# Patient Record
Sex: FEMALE | Race: WHITE | Employment: FULL TIME | URBAN - METROPOLITAN AREA
[De-identification: names, ages, dates, MRNs, and addresses within clinical notes are randomized per-mention and may not be internally consistent; named-entity substitution may affect disease eponyms.]

---

## 2019-10-23 ENCOUNTER — APPOINTMENT (OUTPATIENT)
Dept: CT IMAGING | Facility: CLINIC | Age: 64
DRG: 100 | End: 2019-10-23
Attending: EMERGENCY MEDICINE
Payer: COMMERCIAL

## 2019-10-23 ENCOUNTER — HOSPITAL ENCOUNTER (INPATIENT)
Facility: CLINIC | Age: 64
LOS: 2 days | Discharge: HOME | DRG: 100 | End: 2019-10-25
Attending: EMERGENCY MEDICINE | Admitting: INTERNAL MEDICINE
Payer: COMMERCIAL

## 2019-10-23 ENCOUNTER — APPOINTMENT (OUTPATIENT)
Dept: MRI IMAGING | Facility: CLINIC | Age: 64
DRG: 100 | End: 2019-10-23
Attending: INTERNAL MEDICINE
Payer: COMMERCIAL

## 2019-10-23 DIAGNOSIS — G25.3 MYOCLONIC JERKING: ICD-10-CM

## 2019-10-23 DIAGNOSIS — I10 MALIGNANT ESSENTIAL HYPERTENSION: ICD-10-CM

## 2019-10-23 DIAGNOSIS — R56.9 SEIZURES (H): Primary | ICD-10-CM

## 2019-10-23 DIAGNOSIS — I77.71 CAROTID ARTERY DISSECTION (H): ICD-10-CM

## 2019-10-23 LAB
ANION GAP SERPL CALCULATED.3IONS-SCNC: 5 MMOL/L (ref 3–14)
APTT PPP: 28 SEC (ref 22–37)
BASOPHILS # BLD AUTO: 0 10E9/L (ref 0–0.2)
BASOPHILS NFR BLD AUTO: 0.6 %
BUN SERPL-MCNC: 18 MG/DL (ref 7–30)
CALCIUM SERPL-MCNC: 9.5 MG/DL (ref 8.5–10.1)
CHLORIDE SERPL-SCNC: 107 MMOL/L (ref 94–109)
CO2 SERPL-SCNC: 26 MMOL/L (ref 20–32)
CREAT SERPL-MCNC: 0.68 MG/DL (ref 0.52–1.04)
DIFFERENTIAL METHOD BLD: NORMAL
EOSINOPHIL # BLD AUTO: 0.1 10E9/L (ref 0–0.7)
EOSINOPHIL NFR BLD AUTO: 2.7 %
ERYTHROCYTE [DISTWIDTH] IN BLOOD BY AUTOMATED COUNT: 12.7 % (ref 10–15)
GFR SERPL CREATININE-BSD FRML MDRD: >90 ML/MIN/{1.73_M2}
GLUCOSE SERPL-MCNC: 177 MG/DL (ref 70–99)
HCT VFR BLD AUTO: 40 % (ref 35–47)
HGB BLD-MCNC: 14.2 G/DL (ref 11.7–15.7)
IMM GRANULOCYTES # BLD: 0 10E9/L (ref 0–0.4)
IMM GRANULOCYTES NFR BLD: 0.2 %
INR PPP: 1.12 (ref 0.86–1.14)
LYMPHOCYTES # BLD AUTO: 2.2 10E9/L (ref 0.8–5.3)
LYMPHOCYTES NFR BLD AUTO: 41.8 %
MCH RBC QN AUTO: 30.9 PG (ref 26.5–33)
MCHC RBC AUTO-ENTMCNC: 35.5 G/DL (ref 31.5–36.5)
MCV RBC AUTO: 87 FL (ref 78–100)
MONOCYTES # BLD AUTO: 0.4 10E9/L (ref 0–1.3)
MONOCYTES NFR BLD AUTO: 7.1 %
NEUTROPHILS # BLD AUTO: 2.5 10E9/L (ref 1.6–8.3)
NEUTROPHILS NFR BLD AUTO: 47.6 %
NRBC # BLD AUTO: 0 10*3/UL
NRBC BLD AUTO-RTO: 0 /100
PLATELET # BLD AUTO: 187 10E9/L (ref 150–450)
POTASSIUM SERPL-SCNC: 4 MMOL/L (ref 3.4–5.3)
RBC # BLD AUTO: 4.6 10E12/L (ref 3.8–5.2)
SODIUM SERPL-SCNC: 138 MMOL/L (ref 133–144)
WBC # BLD AUTO: 5.2 10E9/L (ref 4–11)

## 2019-10-23 PROCEDURE — 12000000 ZZH R&B MED SURG/OB

## 2019-10-23 PROCEDURE — 25800030 ZZH RX IP 258 OP 636: Performed by: INTERNAL MEDICINE

## 2019-10-23 PROCEDURE — 96374 THER/PROPH/DIAG INJ IV PUSH: CPT

## 2019-10-23 PROCEDURE — 70498 CT ANGIOGRAPHY NECK: CPT

## 2019-10-23 PROCEDURE — 70553 MRI BRAIN STEM W/O & W/DYE: CPT

## 2019-10-23 PROCEDURE — 85730 THROMBOPLASTIN TIME PARTIAL: CPT | Performed by: EMERGENCY MEDICINE

## 2019-10-23 PROCEDURE — 25500064 ZZH RX 255 OP 636: Performed by: INTERNAL MEDICINE

## 2019-10-23 PROCEDURE — 80048 BASIC METABOLIC PNL TOTAL CA: CPT | Performed by: EMERGENCY MEDICINE

## 2019-10-23 PROCEDURE — 99291 CRITICAL CARE FIRST HOUR: CPT | Mod: GC | Performed by: PSYCHIATRY & NEUROLOGY

## 2019-10-23 PROCEDURE — 70450 CT HEAD/BRAIN W/O DYE: CPT

## 2019-10-23 PROCEDURE — 25000132 ZZH RX MED GY IP 250 OP 250 PS 637: Performed by: INTERNAL MEDICINE

## 2019-10-23 PROCEDURE — 85025 COMPLETE CBC W/AUTO DIFF WBC: CPT | Performed by: EMERGENCY MEDICINE

## 2019-10-23 PROCEDURE — 25800030 ZZH RX IP 258 OP 636: Performed by: EMERGENCY MEDICINE

## 2019-10-23 PROCEDURE — 25000128 H RX IP 250 OP 636: Performed by: PSYCHIATRY & NEUROLOGY

## 2019-10-23 PROCEDURE — 99223 1ST HOSP IP/OBS HIGH 75: CPT | Mod: AI | Performed by: INTERNAL MEDICINE

## 2019-10-23 PROCEDURE — 96375 TX/PRO/DX INJ NEW DRUG ADDON: CPT

## 2019-10-23 PROCEDURE — 85610 PROTHROMBIN TIME: CPT | Performed by: EMERGENCY MEDICINE

## 2019-10-23 PROCEDURE — A9585 GADOBUTROL INJECTION: HCPCS | Performed by: INTERNAL MEDICINE

## 2019-10-23 PROCEDURE — 25000128 H RX IP 250 OP 636: Performed by: EMERGENCY MEDICINE

## 2019-10-23 PROCEDURE — 93005 ELECTROCARDIOGRAM TRACING: CPT

## 2019-10-23 PROCEDURE — 99285 EMERGENCY DEPT VISIT HI MDM: CPT | Mod: 25

## 2019-10-23 PROCEDURE — 25000125 ZZHC RX 250: Performed by: EMERGENCY MEDICINE

## 2019-10-23 PROCEDURE — 95813 EEG EXTND MNTR 61-119 MIN: CPT

## 2019-10-23 PROCEDURE — 0042T CT HEAD PERFUSION WITH CONTRAST: CPT

## 2019-10-23 PROCEDURE — 99292 CRITICAL CARE ADDL 30 MIN: CPT | Mod: GC | Performed by: PSYCHIATRY & NEUROLOGY

## 2019-10-23 RX ORDER — AMOXICILLIN 250 MG
2 CAPSULE ORAL 2 TIMES DAILY PRN
Status: DISCONTINUED | OUTPATIENT
Start: 2019-10-23 | End: 2019-10-25 | Stop reason: HOSPADM

## 2019-10-23 RX ORDER — PROCHLORPERAZINE MALEATE 5 MG
10 TABLET ORAL EVERY 6 HOURS PRN
Status: DISCONTINUED | OUTPATIENT
Start: 2019-10-23 | End: 2019-10-25 | Stop reason: HOSPADM

## 2019-10-23 RX ORDER — SODIUM CHLORIDE 9 MG/ML
INJECTION, SOLUTION INTRAVENOUS CONTINUOUS
Status: DISCONTINUED | OUTPATIENT
Start: 2019-10-23 | End: 2019-10-24

## 2019-10-23 RX ORDER — LISINOPRIL 5 MG/1
5 TABLET ORAL DAILY
Status: DISCONTINUED | OUTPATIENT
Start: 2019-10-23 | End: 2019-10-24

## 2019-10-23 RX ORDER — HYDRALAZINE HYDROCHLORIDE 20 MG/ML
10 INJECTION INTRAMUSCULAR; INTRAVENOUS EVERY 4 HOURS PRN
Status: DISCONTINUED | OUTPATIENT
Start: 2019-10-23 | End: 2019-10-25 | Stop reason: HOSPADM

## 2019-10-23 RX ORDER — LEVETIRACETAM 10 MG/ML
1000 INJECTION INTRAVASCULAR EVERY 12 HOURS
Status: DISCONTINUED | OUTPATIENT
Start: 2019-10-24 | End: 2019-10-25 | Stop reason: HOSPADM

## 2019-10-23 RX ORDER — LIDOCAINE 40 MG/G
CREAM TOPICAL
Status: DISCONTINUED | OUTPATIENT
Start: 2019-10-23 | End: 2019-10-25 | Stop reason: HOSPADM

## 2019-10-23 RX ORDER — LABETALOL HYDROCHLORIDE 5 MG/ML
20 INJECTION, SOLUTION INTRAVENOUS ONCE
Status: COMPLETED | OUTPATIENT
Start: 2019-10-23 | End: 2019-10-23

## 2019-10-23 RX ORDER — ACETAMINOPHEN 325 MG/1
650 TABLET ORAL EVERY 4 HOURS PRN
Status: DISCONTINUED | OUTPATIENT
Start: 2019-10-23 | End: 2019-10-25 | Stop reason: HOSPADM

## 2019-10-23 RX ORDER — LABETALOL HYDROCHLORIDE 5 MG/ML
10 INJECTION, SOLUTION INTRAVENOUS
Status: DISCONTINUED | OUTPATIENT
Start: 2019-10-23 | End: 2019-10-25 | Stop reason: HOSPADM

## 2019-10-23 RX ORDER — HYDROCODONE BITARTRATE AND ACETAMINOPHEN 5; 325 MG/1; MG/1
1-2 TABLET ORAL EVERY 4 HOURS PRN
Status: DISCONTINUED | OUTPATIENT
Start: 2019-10-23 | End: 2019-10-25 | Stop reason: HOSPADM

## 2019-10-23 RX ORDER — AMOXICILLIN 250 MG
1 CAPSULE ORAL 2 TIMES DAILY PRN
Status: DISCONTINUED | OUTPATIENT
Start: 2019-10-23 | End: 2019-10-25 | Stop reason: HOSPADM

## 2019-10-23 RX ORDER — ONDANSETRON 2 MG/ML
4 INJECTION INTRAMUSCULAR; INTRAVENOUS EVERY 6 HOURS PRN
Status: DISCONTINUED | OUTPATIENT
Start: 2019-10-23 | End: 2019-10-25 | Stop reason: HOSPADM

## 2019-10-23 RX ORDER — LORAZEPAM 2 MG/ML
2 INJECTION INTRAMUSCULAR ONCE
Status: COMPLETED | OUTPATIENT
Start: 2019-10-23 | End: 2019-10-23

## 2019-10-23 RX ORDER — ONDANSETRON 4 MG/1
4 TABLET, ORALLY DISINTEGRATING ORAL EVERY 6 HOURS PRN
Status: DISCONTINUED | OUTPATIENT
Start: 2019-10-23 | End: 2019-10-25 | Stop reason: HOSPADM

## 2019-10-23 RX ORDER — ASPIRIN 81 MG/1
81 TABLET ORAL DAILY
Status: DISCONTINUED | OUTPATIENT
Start: 2019-10-23 | End: 2019-10-25 | Stop reason: HOSPADM

## 2019-10-23 RX ORDER — POLYETHYLENE GLYCOL 3350 17 G/17G
17 POWDER, FOR SOLUTION ORAL DAILY PRN
Status: DISCONTINUED | OUTPATIENT
Start: 2019-10-23 | End: 2019-10-25 | Stop reason: HOSPADM

## 2019-10-23 RX ORDER — IOPAMIDOL 755 MG/ML
120 INJECTION, SOLUTION INTRAVASCULAR ONCE
Status: COMPLETED | OUTPATIENT
Start: 2019-10-23 | End: 2019-10-23

## 2019-10-23 RX ORDER — IBUPROFEN 200 MG
200-600 TABLET ORAL DAILY PRN
Status: ON HOLD | COMMUNITY
End: 2019-10-25

## 2019-10-23 RX ORDER — PROCHLORPERAZINE 25 MG
25 SUPPOSITORY, RECTAL RECTAL EVERY 12 HOURS PRN
Status: DISCONTINUED | OUTPATIENT
Start: 2019-10-23 | End: 2019-10-25 | Stop reason: HOSPADM

## 2019-10-23 RX ORDER — NALOXONE HYDROCHLORIDE 0.4 MG/ML
.1-.4 INJECTION, SOLUTION INTRAMUSCULAR; INTRAVENOUS; SUBCUTANEOUS
Status: DISCONTINUED | OUTPATIENT
Start: 2019-10-23 | End: 2019-10-25 | Stop reason: HOSPADM

## 2019-10-23 RX ORDER — GADOBUTROL 604.72 MG/ML
9 INJECTION INTRAVENOUS ONCE
Status: COMPLETED | OUTPATIENT
Start: 2019-10-23 | End: 2019-10-23

## 2019-10-23 RX ORDER — LORAZEPAM 2 MG/ML
2 INJECTION INTRAMUSCULAR
Status: DISCONTINUED | OUTPATIENT
Start: 2019-10-23 | End: 2019-10-25 | Stop reason: HOSPADM

## 2019-10-23 RX ORDER — BISACODYL 10 MG
10 SUPPOSITORY, RECTAL RECTAL DAILY PRN
Status: DISCONTINUED | OUTPATIENT
Start: 2019-10-23 | End: 2019-10-25 | Stop reason: HOSPADM

## 2019-10-23 RX ADMIN — GADOBUTROL 9 ML: 604.72 INJECTION INTRAVENOUS at 20:00

## 2019-10-23 RX ADMIN — SODIUM CHLORIDE 100 ML: 9 INJECTION, SOLUTION INTRAVENOUS at 13:33

## 2019-10-23 RX ADMIN — LORAZEPAM 2 MG: 2 INJECTION INTRAMUSCULAR; INTRAVENOUS at 14:51

## 2019-10-23 RX ADMIN — LEVETIRACETAM 1500 MG: 100 INJECTION, SOLUTION INTRAVENOUS at 15:33

## 2019-10-23 RX ADMIN — ASPIRIN 81 MG: 81 TABLET, DELAYED RELEASE ORAL at 17:49

## 2019-10-23 RX ADMIN — LISINOPRIL 5 MG: 5 TABLET ORAL at 17:49

## 2019-10-23 RX ADMIN — SODIUM CHLORIDE: 9 INJECTION, SOLUTION INTRAVENOUS at 17:49

## 2019-10-23 RX ADMIN — LABETALOL HYDROCHLORIDE 20 MG: 5 INJECTION INTRAVENOUS at 14:56

## 2019-10-23 RX ADMIN — IOPAMIDOL 120 ML: 755 INJECTION, SOLUTION INTRAVENOUS at 13:32

## 2019-10-23 ASSESSMENT — ENCOUNTER SYMPTOMS
WEAKNESS: 1
HEADACHES: 0
NECK PAIN: 0
SPEECH DIFFICULTY: 1

## 2019-10-23 ASSESSMENT — ACTIVITIES OF DAILY LIVING (ADL): ADLS_ACUITY_SCORE: 18

## 2019-10-23 NOTE — PLAN OF CARE
Pt arrived on floor at 1700. Pt A/Ox4. VSS. Up 1 assist with walker. Denies pain. Voiding adequately. Neuros intact. MRI checklist faxed.

## 2019-10-23 NOTE — PROGRESS NOTES
PRELIMINARY EEG REPORT:    Frequent generalized spike-wave and polyspike-wave cplexes are seen on the EEG. They constitute 30% of the recording. Patient occasionally has upper body jerking with these discharges. Patient is given lorazepam at 14:52 which improves the EEG significantly. Recommend loading with levetiracetam and continue EEG monitoring.    Cary Rivas MD

## 2019-10-23 NOTE — ED NOTES
Patient's respirations are even and non labored. Patient denies CP or SOB. Patient denies headache.

## 2019-10-23 NOTE — PROGRESS NOTES
RECEIVING UNIT ED HANDOFF REVIEW    ED Nurse Handoff Report was reviewed by: Vidhya Alvarenga RN on October 23, 2019 at 4:21 PM

## 2019-10-23 NOTE — PROGRESS NOTES
Patients eeg showing generalized spike and wave complexes, some even not correlated to her jerks. Ativan slowed down frequency of seizures .    Pt loaded with 1500 mg keppra. She will require general neuro consult and vEEG overnight, keppra should be continued. Stroke team will sign off, please call if needed.

## 2019-10-23 NOTE — H&P
Admitted:     10/23/2019      PRIMARY CARE PROVIDER:  None.      HISTORY OF PRESENT ILLNESS:  Molly is a very pleasant 63-year-old female from [place], [place], here in [place] for a work-related conference, with no significant past medical history who presents to the emergency room today for evaluation of new onset neurologic symptoms including jerking of her legs and arms.  History is obtained per discussions with the patient as well as her 2 friends who accompanied her to the emergency room this afternoon.      She has no known past medical history, though notes it has been many years since she last saw a physician.  She does not take any medications on a daily basis.  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  She had been in her usual state of health.  She had a few drinks with work colleagues last evening.  This morning, she awoke feeling normal.  At around noon, she developed some new onset jerking in her upper and lower extremities.  Her friends say she was observed to have myoclonic jerking and twitching of her bilateral upper and lower extremities.  Her speech appeared slow during this time and she had some perceived confusion.  Her friends tried to help her out of a chair to seek medical attention, but she was unable to support herself and fell to the ground.  EMS was called and she was brought to the emergency room.      In the emergency room, she was seen and evaluated by Dr. Rogerio Santiago.  She was afebrile.  She was mildly tachycardic with heart rate of 102 and hypertensive with an initial blood pressure of 157/117 but quickly spiked as high as 223 systolic.  Basic labs including a BMP and CBC were unremarkable.  A code stroke was called.  Stat head imaging was obtained.  A head CT showed no evidence of acute intracranial hemorrhage or mass.  A CTA showed findings concerning for dissection of the right cervical internal carotid artery.  Given no gross perfusion deficits  on a previous CT perfusion study, it was felt that the carotid occlusion was potentially a chronic etiology.  Per neurology, a stat EEG was obtained and per Dr. Santiago', has shown some abnormal findings concerning for seizure.  She was given 2 mg of Ativan initially and will now receive 1500 mg of IV Keppra.  The plan at this juncture will be to admit her to the Neurology floor for further evaluation and management of seizures.  In regards to her hypertension, she was given 20 mg of IV labetalol with significant improvement and on last check just now her blood pressure had improved to 167/84.      When I saw the patient in the emergency room, she was alert and answering questions appropriately.  I did not observe any jerking motions and she appeared in no acute distress.      PAST MEDICAL HISTORY:  None.  Patient notes it has been several years since she had seen a physician.  She denies any known medical history of hypertension, hyperlipidemia, diabetes or cardiovascular disease.      PAST SURGICAL HISTORY:  None.      SOCIAL HISTORY:  The patient is a lifelong nonsmoker.  She drinks alcohol on occasion. She had 3 drinks last evening, but does not drink on a daily basis.  She has no history of drug use.      FAMILY HISTORY:  Both her mother and father  of a lung cancer.  She notes they were heavy smokers.  She has no family members with history of seizures, cardiovascular disease or diabetes.      HOME MEDICATIONS:  None.      ALLERGIES:  The patient has no known drug allergies.      REVIEW OF SYSTEMS:  A full 10-point review of systems was discussed with this patient and negative unless otherwise stated per HPI.      PHYSICAL EXAMINATION:   VITAL SIGNS:  Temperature 97.3, pulse 81, respirations 17, blood pressure 167/84, O2 sat 94% on room air.   GENERAL:  The patient is a well-nourished, well-developed female.  She appears her stated age, alert and answering questions appropriately, in no acute distress.    NEUROLOGIC:  Cranial nerves II-XII are grossly intact.  Strength is intact and symmetric in bilateral upper and lower extremities.  Sensation is intact to light touch throughout.  She does have some past pointing in bilateral upper extremities with finger-to-nose motion.  Gait was not assessed.   CARDIOVASCULAR:  Heart rate and rhythm regular, no murmurs, gallops or rubs.  Pulses are +2 and symmetric in bilateral upper extremities.  There is no extremity edema.   RESPIRATORY:  Lungs are clear to auscultation bilaterally.  No wheezes, rales or rhonchi, no increased work of breathing or accessory muscle use.   ABDOMEN:  Soft, nontender, nondistended, positive bowel sounds throughout.   INTEGUMENTARY:  Warm, dry, no rashes, jaundice or ecchymosis.      LABORATORY DATA AND IMAGING:  BMP shows sodium of 138, potassium 4.0, creatinine 0.68, calcium 9.5, glucose 177.  CBC showed white count of 5.2, hemoglobin 14.2 and a platelet count of 187.  INR is 1.12.      EKG showed normal sinus rhythm with no acute ST or T-wave changes.      Head imaging included a head CT without contrast that was negative for evidence of acute intracranial hemorrhage, mass or herniation.      A CT angiogram of the head and neck showed findings concerning for dissection of the right cervical internal carotid artery with non-opacification from the level of the proximal cervical right internal carotid artery to its paraclinoid segment.  There was no other obvious central arterial occlusion.  CT perfusion scan showed no gross focal or regional perfusion asymmetry, suggesting that the right carotid occlusion was potentially a chronic phenomenon.  She was also noted to have a 2.3cm nodularity of the right lobe of the thyroid.     ASSESSMENT AND PLAN:  Molly Davis is a very pleasant 63-year-old female with no significant past medical history, here from [place], [place], here in [place] for work conference who presents to the emergency room for  evaluation of new onset jerking motions in her upper and lower extremities with slowed speech and confusion.  Initial workup in the emergency room was negative for stroke.  An EEG is being done at this time and is suggestive of a seizure.  She will be admitted to the [facility] for ongoing evaluation and care.   1.  Suspected new onset seizures.  Patient's symptoms began acutely at noon this afternoon.  She has no recent injury or trauma.  She has no previously diagnosed medical problems, though again it has been some time since she has seen a physician.  Initial code stroke workup in the emergency room was nonrevealing.  An EEG is being obtained at the time of this dictation and showed some abnormalities concerning for seizure-like activity.  It was observed that after an initial dose of lorazepam her EEG had significantly improved.  It has been recommended that she be loaded with IV Keppra and admitted to the hospital.   1500 mg of Keppra was given in the emergency room.  Will continue EEG monitoring.  General Neurology Service has been consulted to assist with ongoing evaluation and management of her seizure.  Will continue neuro checks per floor protocol.  Continue Ativan as needed.  I have initially dosed her Keppra at 1000 mg b.i.d.  Can titrate dosing further as needed.  Await input from General Neurology Service.  I suspect her difficulties with ambulating today were due to the seizures themselves.  If her weakness persists once seizures are well managed, would consider a PT consult.     2.  Right internal carotid artery dissection.  Again, given lack of abnormalities noted on a CT perfusion scan, suspected this is likely chronic in nature.  No recommendations for anticoagulation at this point.  She will be started on a low-dose aspirin per Neurology recommendations.  Ongoing BP management as below.   3.  Hypertension, with hypertensive urgency.  As above, the patient's systolic blood pressures were  upwards of 220 while in the emergency room today.  She denies any known history of hypertension.  After 20 mg of IV labetalol her pressures have significantly improved to the 160s systolic.  Per Neurology recommendations, will keep a goal systolic blood pressure less than 180.  I have started her on a baseline antihypertensive with lisinopril 5 mg daily.  Labetalol and hydralazine are also available as needed.   4.  Thyroid nodule.  Incidentally noted on CT imaging today.  Have ordered a TSH for tomorrow morning.  Will need to establish with a primary care provider on return to [place] for continued evaluation.     Deep venous thrombosis prophylaxis.  PCDs.      CODE STATUS:  I discussed this with the patient at bedside.  She wishes to be full code.      As I anticipate she will be here for greater than a 2-midnight stay to complete workup of seizures and initiate appropriate treatment, she has been admitted under inpatient status.         VINCE COLE DO             D: 10/23/2019   T: 10/23/2019   MT: SHAYLA      Name:     VITA NEVILLE   MRN:      [id number]        Account:      [id number]   :      1955        Admitted:     10/23/2019                   Document: [id number]

## 2019-10-23 NOTE — PHARMACY-ADMISSION MEDICATION HISTORY
Admission medication history interview status for the 10/23/2019  admission is complete. See EPIC admission navigator for prior to admission medications     Medication history source reliability:Good - patient    Actions taken by pharmacist (provider contacted, etc): Discussed med list with patient     Additional medication history information not noted on PTA med list : She reports only takes Advil or Aleve as needed, does not take regular daily medications.    Medication reconciliation/reorder completed by provider prior to medication history? No    Time spent in this activity: 5 min    Prior to Admission medications    Medication Sig Last Dose Taking? Auth Provider   ibuprofen (ADVIL/MOTRIN) 200 MG tablet Take 200-600 mg by mouth daily as needed for mild pain Past Week at Unknown time Yes Unknown, Entered By History   Naproxen Sodium (ALEVE PO) Take 1 tablet by mouth daily as needed for moderate pain Past Week at Unknown time Yes Unknown, Entered By History

## 2019-10-23 NOTE — ED TRIAGE NOTES
Patient brought in by EMS. EMS reports patient had some involuntary jerking movements and patient was unable to walk or get up.

## 2019-10-23 NOTE — ED NOTES
Bed: ST01  Expected date:   Expected time:   Means of arrival:   Comments:  Tamara 514--64 yo F--Stroke alert--onset 1200--eta 1300

## 2019-10-23 NOTE — PROGRESS NOTES
Formerly [facility], [facility]  EEG completed in ER sto 1,   1 hour with Video done.  Consent for video per patient. Ordering Dr. JENN Santiago.

## 2019-10-23 NOTE — ED PROVIDER NOTES
"  History     Chief Complaint:    Uncontrollable jerkiness and confusion     The history is provided by the patient and the EMS personnel.      Molly Davis is a 63 year old female who arrived via EMS with minimal medical history because she \"hasn't been to a doctor in a long time\", who presents with uncontrollable \"jerkiness\", confusion and delayed speech. The patient was visiting from [place], [place] for a conference. About an hour ago, at 1200, she started having uncontrolled jerking sensations that caused her to fall twice just before everyone was going to leave to catch their flights home. The patient was unable to get up and walk, but she did not hit her head. The patient has never experienced the jerkiness before. She is now confused and has delayed speech. She reports having 3 scotches last night, but doesn't normally drink. The patient denies headache or neck pain.     Allergies:  No known drug allergies    Medications:    The patient is not currently taking any prescribed medications.    Past Medical History:    The patient denies any significant past medical history.    Past Surgical History:    The patient does not have any pertinent past surgical history.    Family History:    No past pertinent family history.    Social History:  Presents to the ED with EMS at the bedside  Tobacco Use: n/a  Alcohol Use: yes  Drug Use: n/a  Marital Status: n/a    Review of Systems   Musculoskeletal: Negative for neck pain.   Neurological: Positive for speech difficulty and weakness. Negative for headaches. Tremors: jerky movements         Uncontrollable jerkiness    All other systems reviewed and are negative.        Physical Exam     Patient Vitals for the past 24 hrs:   BP Temp Pulse Heart Rate Resp SpO2 Weight   10/23/19 1515 (!) 167/84 -- 81 80 17 92 % --   10/23/19 1502 (!) 150/68 -- -- 77 19 93 % --   10/23/19 1500 (!) 205/94 -- 77 78 19 92 % --   10/23/19 1445 (!) 211/87 -- 102 99 23 -- --   10/23/19 1430 (!) " 215/102 -- 100 101 16 -- --   10/23/19 1415 (!) 223/117 -- 106 105 12 -- --   10/23/19 1400 -- -- 106 102 13 -- --   10/23/19 1345 (!) 214/89 -- 101 -- -- 96 % --   10/23/19 1340 (!) 147/89 -- -- -- -- -- --   10/23/19 1315 (!) 157/117 -- 103 -- -- 100 % --   10/23/19 1313 (!) 157/117 97.3  F (36.3  C) 103 -- 16 99 % 88.2 kg (194 lb 7.1 oz)     Physical Exam  GENERAL: well developed, pleasant  HEAD: atraumatic  EYES: pupils reactive, extraocular muscles intact, conjunctivae normal  ENT:  mucus membranes moist  NECK:  trachea midline, normal range of motion  RESPIRATORY: no tachypnea, breath sounds clear to auscultation   CVS: normal S1/S2, no murmurs, intact distal pulses. Mild tachycardia  ABDOMEN: soft, nontender, nondistention  MUSCULOSKELETAL: no deformities  SKIN: warm and dry, no acute rashes or ulceration  NEURO: GCS 15, cranial nerves intact, alert and oriented x3 Occasional mono clonic jerking motions of the arm and occasional of the legs. No facial twitiching. Slightly slow speech Alert and oriented. Cannot follow complex commands.   PSYCH:  Mood/affect normal    Emergency Department Course   Indication: uncontrollable jerkiness   Time: 1347  Vent. Rate 99 bpm. IN interval 146. QRS duration 86. QT/QTc 374/479. P-R-T axis 72 59 82.  Normal sinus rhythm. Possible left atrial enlargement. Low voltage QRS. Borderline EKG. Read time: 1350 by Dr. Jack KING.     Imaging:  Radiology findings were communicated with the patient who voiced understanding of the findings.    CT head perfusion w/ IV contrast:   No gross focal or regional perfusion asymmetry is  identified. Therefore, the right carotid occlusion is potentially a  chronic phenomenon. Clinical correlation is recommended. Consider MRI  for further characterization, as warranted, as per radiology.    CT head and neck w/ IV contrast:   1. Findings concerning for dissection of the right cervical internal  carotid artery, with nonopacification from the level of  the proximal  cervical right internal carotid artery to its paraclinoid segment.  There is reconstitution into the right carotid terminus via a patent  [place] of [place].  2. No obvious central arterial occlusion involving the middle cerebral  arteries, anterior cerebral arteries, posterior cerebral arteries, or  the vertebrobasilar system, as per radiology.    CT head w/o IV contrast:   No evidence of acute intracranial hemorrhage, mass, or  herniation. ASPECT SCORE = 10, as per radiology.    Laboratory:  Laboratory findings were communicated with the patient who voiced understanding of the findings.    CBC: WBC: 5.2, HGB: 14.2, PLT: 187  BMP: Glucose 177 (H) o/w WNL (Creatinine 0.68)  INR: 1.12  PTT (Collected at 1314): 28    Interventions:  1451 Ativan 2 mg IV  1456 labetalol 20 mg IV  1533 Keppra 1500 mg PO    Emergency Department Course:  Past medical records, nursing notes, and vitals reviewed.     EKG obtained in the ED, see results above.    IV was inserted and blood was drawn for laboratory testing, results above.  The patient was sent for a CT while in the emergency department, results above.     1304 Dr. Santiago in room waiting for patient   1307 EMS arrives with the patient.  1311 Dr. Santiago performs patient exam   1317 Dr. Santiago called code stroke.  1319 Patient left for CAT scan.   1320 Dr. Santiago went to CT.   1440 Patient recheck.   1529 I consulted with Dr. Navarro/Lee, general neurology, regarding the patient's history and presentation here in the emergency department.  1554 I consulted with Dr. Navarro/Lee, general neurology, regarding the patient's history and presentation here in the emergency department.    1630: I consulted with Dr. Diallo of the hospitalist services, who is in agreement to accept the patient for admission.    Findings and plan explained to the Patient who consents to admission. Discussed the patient with Dr. Diallo, who will admit the patient to a  bed for further monitoring,  evaluation, and treatment.    I personally reviewed the laboratory and imaging results with the Patient and answered all related questions prior to admission.    Impression & Plan     Medical Decision Making:  Patient presents with sudden onset of myoclonic jerking and slow speech with mild confusion.  She is awake throughout this.  She has not seen medical care in many years and is from out of town.  Code stroke was called given the atypical presentation and speech difficulties.  CT shows a dissection versus occlusion that is suspected to be chronic.  Stat EEG showed abnormal waveforms and was suggested to give medications for seizures.  She was given Ativan and Keppra with resolution of her abnormal movements.  Patient was also noted to have bigeminy when she first arrived before getting CT, but this subsided.  Patient was also noted to be quite hypertensive persistently and was given treatment for this.  Neuro critical care was initially involved and suggested general neurology is felt that it was more of a seizure related issue.  Suggestion is MRI brain, seizure treatment, blood pressure management, and follow-up with neurologist regarding the carotid artery finding back [place] home state.  Patient will be admitted for ongoing work-up and treatment.    Critical Care Time: was 35 minutes for this patient excluding procedures    Discharge Diagnosis:    ICD-10-CM    1. Carotid artery dissection (H) I77.71     versus chronic occlusion   2. Myoclonic jerking G25.3    3. Malignant essential hypertension I10               CMS Diagnoses:      Disposition:  Admission     Scribe Disclosure:  Rose Marie SANFORD, am serving as a scribe at 1:10 PM on 10/23/2019 to document services personally performed byRogerio Santiago MD based on my observations and the provider's statements to me.   Scribe Disclosure:  Amaris SANFORD, am serving as a scribe at 1:43 PM on 10/23/2019 to document services personally performed by Jack  Rogerio WILSON MD based on my observations and the provider's statements to me.    10/23/2019    EMERGENCY DEPARTMENT       Rogerio Santiago MD  10/24/19 0952

## 2019-10-23 NOTE — ED NOTES
Patient seen to have intermittent jerking movements of extremities. Patient having difficulty with word finding and following complex instructions and 3 level commands.

## 2019-10-23 NOTE — CONSULTS
[facility]    Stroke Consult Note    Reason for Consult: full body jerking    Chief Complaint: Generalized Weakness      HPI  Molly Davis is a 63 year old female no significant medical history with full body jerking.  Stated that she woke up with full body jerking was with friends until noontime when she developed worsening of jerking and had a fall, she was then transferred to here for stroke eval.  No h/o of seizures or h/o stroke.     TPA Treatment   Not likely stroke    Endovascular Treatment  No large vessel occlusion    Impression  Unlikely stroke. Possible multifocal myoclonic jerks in setting of seizures. She also has some slowness in response to questions and also difficulty with attention on mental status exam and also difficulty following 2 step commands.  CTH negative for bleed, CTA does show likely chronically occluded R ICA, CTP negative for perfusion deficit.  Of note her BP is high casey 200s on admission which may cause hypertensive encephalopathy.       Recommendations:  [] Stat 1 hour EEG to rule out seizures  [] Treatment of hypertensive encephalopathy as that may be causing her slowness of thinking  [] If patient is admitted to floor, patient will require general neurology consult  [] MRI brain with and w/o contrast to investigate cause of these jerks  [] Ok to admit to floor   [] Stroke team will likely sign off , ok to start ASA 81 mg qday from stroke side, she should see a neurologist back home in [place] so they can monitor her carotids on regular basis         ______________________________________________________    Past Medical History   No past medical history on file.  Past Surgical History   No past surgical history on file.  Medications   Home Meds  Prior to Admission medications    Not on File       Scheduled Meds    sodium chloride 0.9 %  100 mL Intravenous Once     iopamidol  120 mL Intravenous Once       Infusion Meds      PRN Meds      Allergies   No Known  Allergies  Family History   No family history on file.  Social History   Social History     Tobacco Use     Smoking status: Not on file   Substance Use Topics     Alcohol use: Not on file     Drug use: Not on file       Review of Systems   The 10 point Review of Systems is negative other than noted in the HPI or here.        PHYSICAL EXAMINATION  Temp:  [97.3  F (36.3  C)] 97.3  F (36.3  C)  Pulse:  [103] 103  Resp:  [16] 16  BP: (157)/(117) 157/117  SpO2:  [99 %] 99 %     General:  patient lying in bed without any acute distress    HEENT:  normocephalic/atraumatic  Cardio:  RRR  Pulmonary:  no respiratory distress  Abdomen: soft non tender  Extremities: pulses intact  Skin:  no lesions     Neurologic  Mental Status:  alert, oriented x 3, follows commands, speech clear and fluent, naming and repetition normal  Her attention is diminished unable to spell word WORLD backwards, requires much prompting, and unable to follow 2 steps commands   Cranial Nerves:  visual fields intact, EOMI with normal smooth pursuit, hearing not formally tested but intact to conversation, no dysarthria, shoulder shrug equal bilaterally, tongue protrusion midline, no facial droop. Sensory equal bilaterally in v/1/2/3 distribution  Motor: no pronator drift  5/5 in all  4 ext  Multifocal segmental myoclonic jerks    Reflexes:  deferred  Sensory:  sensation decrease to light touch on the left body, no extinction  Coordination:  normal finger-to-nose and heel-to-shin bilaterally without dysmetria  Station/Gait:  deferred     Dysphagia Screen  Per Nursing    Stroke Scales    NIHSS  Interval baseline (10/23/19 1330)   Interval Comments     1a. Level of Consciousness 0-->Alert, keenly responsive   1b. LOC Questions 0-->Answers both questions correctly   1c. LOC Commands 0-->Performs both tasks correctly   2.   Best Gaze 0-->Normal   3.   Visual 0-->No visual loss   4.   Facial Palsy 0-->Normal symmetrical movements   5a. Motor Arm, Left 0-->No  drift, limb holds 90 (or 45) degrees for full 10 secs   5b. Motor Arm, Right 0-->No drift, limb holds 90 (or 45) degrees for full 10 secs   6a. Motor Leg, Left 0-->No drift, leg holds 30 degree position for full 5 secs   6b. Motor Leg, right 0-->No drift, leg holds 30 degree position for full 5 secs   7.   Limb Ataxia 0-->Absent   8.   Sensory 0-->Normal, no sensory loss   9.   Best Language 0-->No aphasia, normal   10. Dysarthria 0-->Normal   11. Extinction and Inattention  0-->No abnormality   Total 0 (10/23/19 1330)       Imaging  I personally reviewed all imaging; relevant findings per HPI.     Lab Results Data   CBC  Recent Labs   Lab 10/23/19  1314   WBC 5.2   RBC 4.60   HGB 14.2   HCT 40.0        Basic Metabolic Panel    No results for input(s): NA, POTASSIUM, CHLORIDE, CO2, BUN, CR, GLC, DENZEL in the last 168 hours.  Liver Panel  No lab results found.  INRNo lab results found.   Lipid ProfileNo lab results found.  A1CNo lab results found.  Troponin Steve results for input(s): TROPI in the last 168 hours.       Stroke Code / Stroke Consult Data Data   Stroke Code Data  (for stroke code without tele)  Stroke code activated 10/23/19   1318   First stroke provider response 10/23/19   1322   Last known normal 10/23/19   1200   Time of discovery   (or onset of symptoms) 10/23/19   1200   Head CT read by me      no   Was stroke code de-escalated? Yes

## 2019-10-23 NOTE — ED NOTES
"[facility]  ED Nurse Handoff Report    ED Chief complaint: Generalized Weakness      ED Diagnosis:   Final diagnoses:   Carotid artery dissection (H) - versus chronic occlusion   Myoclonic jerking   Malignant essential hypertension       Code Status: Not addressed by ED MD.    Allergies: No Known Allergies    Activity level - Baseline/Home:  Independent  Activity Level - Current:   Stand with Assist of 2    Patient's Preferred language: English   Needed?: No    Isolation: No  Infection: Not Applicable  Bariatric?: No    Vital Signs:   Vitals:    10/23/19 1313 10/23/19 1340   BP: (!) 157/117 (!) 147/89   Pulse: 103    Resp: 16    Temp: 97.3  F (36.3  C)    SpO2: 99%    Weight: 88.2 kg (194 lb 7.1 oz)        Cardiac Rhythm: ,        Pain level: 0-10 Pain Scale: 0    Is this patient confused?: Yes   Does this patient have a guardian?  No         If yes, is there guardianship documents in the Epic \"Code/ACP\" activity?  N/A         Guardian Notified?  N/A  [place] - Suicide Severity Rating Scale Completed?  Yes  If yes, what color did the patient score?  White    Patient Report: Initial Complaint: Confusion/generalized weakness  Focused Assessment: Patient is from [place] and was here for a Parish meeting for the [employer]. Patient was to fly back to [place] today. Patient comes in with complaints of involuntary jerking movements since 1200 today. Patient was attempting to get up and legs buckled under her causing patient to fall. Patient denies headache and denies pain.    Patient is having some difficulty with word finding and following complex commands.    Patient's respirations are even and non labored. Patient denies CP or SOB. Patient's BP is 223/117.    Patient is having an EEG in ED.   Tests Performed:   Results for orders placed or performed during the hospital encounter of 10/23/19   CTA Head Neck with Contrast    Narrative    CT ANGIOGRAM OF THE HEAD AND NECK WITH CONTRAST October " 23, 2019 1:33  PM     HISTORY: Transient ischemic infarct, initial exam. Code Stroke.     TECHNIQUE: CT angiography with an injection of 70mL ISOVUE-370 IV with  scans through the head and neck. Images were transferred to a separate  3-D workstation where multiplanar reformations and 3-D images were  created. Estimates of carotid stenoses are made relative to the distal  internal carotid artery diameters except as noted. Radiation dose for  this scan was reduced using automated exposure control, adjustment of  the mA and/or kV according to patient size, or iterative  reconstruction technique.      COMPARISON: CT head of same date.    CT HEAD FINDINGS: No contrast enhancing lesions. Please see separate  report from CT head of same date for details regarding structural  intracranial findings.    CT ANGIOGRAM HEAD FINDINGS: The distal cervical, petrous, cavernous  and paraclinoid segments of the right internal carotid artery are  occluded. There is reconstitution into the right carotid terminus,  likely related to collateral flow across a patent [place] of Patrick.  The bilateral middle and anterior cerebral arteries appear patent  centrally. There is a hypoplastic A1 segment of the right anterior  cerebral artery. The anterior communicating artery and right posterior  communicating artery are present. The left posterior communicating  artery appears to be hypoplastic.    There is mild stenosis of the V4 segment of the right vertebral  artery. The left vertebral artery is patent. The bilateral basilar  artery and its major branches are patent. The bilateral posterior  cerebral arteries are patent. No aneurysm or other vascular  malformation is seen.    CT ANGIOGRAM NECK FINDINGS: Normal origin of the great vessels from  the aortic arch.     Right carotid artery: There is tapered narrowing of the right cervical  internal carotid artery just distal to the carotid bifurcation with  subsequent complete nonopacification from  the proximal cervical  internal carotid artery to the level of the skull base and involving  the intracranial internal carotid artery to the level of the  paraclinoid region. The findings are suspicious for right cervical  internal carotid artery dissection. There is a background of mild  mixed plaque at the right carotid bifurcation.    Left carotid artery: The left common and internal carotid arteries are  patent. No significant stenosis or atherosclerotic disease in the  carotid artery. Tortuous left internal carotid artery.    Vertebral arteries: Vertebral arteries are patent without evidence of  dissection. No significant stenosis.     Other findings: Mildly heterogeneous thyroid gland with calcification  in the left thyroid lobe but no dominant mass. Degenerative disc  disease, most pronounced at C5-C6 and C6-C7.      Impression    IMPRESSION:  1. Findings concerning for dissection of the right cervical internal  carotid artery, with nonopacification from the level of the proximal  cervical right internal carotid artery to its paraclinoid segment.  There is reconstitution into the right carotid terminus via a patent  [place] of Patrick.  2. No obvious central arterial occlusion involving the middle cerebral  arteries, anterior cerebral arteries, posterior cerebral arteries, or  the vertebrobasilar system.    The findings were discussed by phone by Dr. Ying with Dr. Santiago at  1:37 PM on 10/23/2019.      CT Head Perfusion w Contrast    Narrative    CT BRAIN PERFUSION October 23, 2019 1:46 PM    HISTORY: Transient ischemic attack, initial exam. Code Stroke.    TECHNIQUE: Time sequential axial CT images of the head were acquired  during the administration of 70mL ISOVUE-370 IV. Color perfusion maps  of the brain were created from this time sequential axial source data.      Radiation dose for this scan was reduced using automated exposure  control, adjustment of the mA and/or kV according to patient size,  or  iterative reconstruction technique.    COMPARISON: CT angiogram of the head and neck of same date.    FINDINGS: No gross focal or regional perfusion asymmetry is  identified. Therefore, the right carotid occlusion is potentially a  chronic phenomenon. Clinical correlation is recommended. Consider MRI  for further characterization, as warranted.   CT Head w/o Contrast    Narrative    CT SCAN OF THE HEAD WITHOUT CONTRAST   10/23/2019 1:29 PM     HISTORY: TIA, initial exam. Code stroke.    TECHNIQUE:  Axial images of the head and coronal reformations without  IV contrast material. Radiation dose for this scan was reduced using  automated exposure control, adjustment of the mA and/or kV according  to patient size, or iterative reconstruction technique.    COMPARISON: None.    FINDINGS: There is no evidence of intracranial hemorrhage, mass, acute  infarct or anomaly. Small ovoid hypodensity along the inferomedial  aspect of the lentiform nucleus, which may represent a dilated  perivascular space. The ventricles are normal in size, shape and  configuration. Mild diffuse parenchymal volume loss. Mild patchy  periventricular white matter hypodensities which are nonspecific, but  likely related to chronic microvascular ischemic disease. Scattered  intracranial vascular calcifications are present.    There is a prominent ossified lesion arising from the inner table of  the right anterior parietal calvarium (series 5 image 24) with no  significant associated mass effect. This may represent a small area of  hyperostosis frontalis interna or small osteoma.  The visualized  portions of the sinuses and mastoids appear normal. The bony calvarium  and bones of the skull base appear intact.       Impression    IMPRESSION:   No evidence of acute intracranial hemorrhage, mass, or  herniation. ASPECT SCORE = 10.    The findings were communicated by phone by Dr. Ying to Dr. Santiago at  1:37 PM on 10/23/2019.     Basic metabolic panel    Result Value Ref Range    Sodium 138 133 - 144 mmol/L    Potassium 4.0 3.4 - 5.3 mmol/L    Chloride 107 94 - 109 mmol/L    Carbon Dioxide 26 20 - 32 mmol/L    Anion Gap 5 3 - 14 mmol/L    Glucose 177 (H) 70 - 99 mg/dL    Urea Nitrogen 18 7 - 30 mg/dL    Creatinine 0.68 0.52 - 1.04 mg/dL    GFR Estimate >90 >60 mL/min/[1.73_m2]    GFR Estimate If Black >90 >60 mL/min/[1.73_m2]    Calcium 9.5 8.5 - 10.1 mg/dL   CBC with platelets differential   Result Value Ref Range    WBC 5.2 4.0 - 11.0 10e9/L    RBC Count 4.60 3.8 - 5.2 10e12/L    Hemoglobin 14.2 11.7 - 15.7 g/dL    Hematocrit 40.0 35.0 - 47.0 %    MCV 87 78 - 100 fl    MCH 30.9 26.5 - 33.0 pg    MCHC 35.5 31.5 - 36.5 g/dL    RDW 12.7 10.0 - 15.0 %    Platelet Count 187 150 - 450 10e9/L    Diff Method Automated Method     % Neutrophils 47.6 %    % Lymphocytes 41.8 %    % Monocytes 7.1 %    % Eosinophils 2.7 %    % Basophils 0.6 %    % Immature Granulocytes 0.2 %    Nucleated RBCs 0 0 /100    Absolute Neutrophil 2.5 1.6 - 8.3 10e9/L    Absolute Lymphocytes 2.2 0.8 - 5.3 10e9/L    Absolute Monocytes 0.4 0.0 - 1.3 10e9/L    Absolute Eosinophils 0.1 0.0 - 0.7 10e9/L    Absolute Basophils 0.0 0.0 - 0.2 10e9/L    Abs Immature Granulocytes 0.0 0 - 0.4 10e9/L    Absolute Nucleated RBC 0.0    INR   Result Value Ref Range    INR 1.12 0.86 - 1.14   Partial thromboplastin time   Result Value Ref Range    PTT 28 22 - 37 sec       Abnormal Results:   Abnormal Labs Reviewed   BASIC METABOLIC PANEL - Abnormal; Notable for the following components:       Result Value    Glucose 177 (*)     All other components within normal limits       Treatments provided: EEG    Family Comments: Friends at bedside. Patient does not have a spouse or children.     OBS brochure/video discussed/provided to patient/family: No              Name of person given brochure if not patient: N/A              Relationship to patient: N/A    ED Medications:   Medications   100mL Saline Flush (100 mLs  Intravenous Given 10/23/19 1333)   iopamidol (ISOVUE-370) solution 120 mL (120 mLs Intravenous Given 10/23/19 1332)       Drips infusing?:  No    For the majority of the shift this patient was Green.   Interventions performed were N/A.    Severe Sepsis OR Septic Shock Diagnosis Present: No    To be done/followed up on inpatient unit:  N/A    ED NURSE PHONE NUMBER: [phone]

## 2019-10-24 LAB
ANION GAP SERPL CALCULATED.3IONS-SCNC: 3 MMOL/L (ref 3–14)
BUN SERPL-MCNC: 24 MG/DL (ref 7–30)
CALCIUM SERPL-MCNC: 8.7 MG/DL (ref 8.5–10.1)
CHLORIDE SERPL-SCNC: 110 MMOL/L (ref 94–109)
CO2 SERPL-SCNC: 27 MMOL/L (ref 20–32)
CREAT SERPL-MCNC: 0.67 MG/DL (ref 0.52–1.04)
ERYTHROCYTE [DISTWIDTH] IN BLOOD BY AUTOMATED COUNT: 12.8 % (ref 10–15)
GFR SERPL CREATININE-BSD FRML MDRD: >90 ML/MIN/{1.73_M2}
GLUCOSE SERPL-MCNC: 168 MG/DL (ref 70–99)
HBA1C MFR BLD: 6.3 % (ref 0–5.6)
HCT VFR BLD AUTO: 36 % (ref 35–47)
HGB BLD-MCNC: 12.4 G/DL (ref 11.7–15.7)
MCH RBC QN AUTO: 30.5 PG (ref 26.5–33)
MCHC RBC AUTO-ENTMCNC: 34.4 G/DL (ref 31.5–36.5)
MCV RBC AUTO: 89 FL (ref 78–100)
PLATELET # BLD AUTO: 178 10E9/L (ref 150–450)
POTASSIUM SERPL-SCNC: 3.8 MMOL/L (ref 3.4–5.3)
RBC # BLD AUTO: 4.06 10E12/L (ref 3.8–5.2)
SODIUM SERPL-SCNC: 140 MMOL/L (ref 133–144)
TSH SERPL DL<=0.005 MIU/L-ACNC: 1.24 MU/L (ref 0.4–4)
WBC # BLD AUTO: 4.8 10E9/L (ref 4–11)

## 2019-10-24 PROCEDURE — 85027 COMPLETE CBC AUTOMATED: CPT | Performed by: INTERNAL MEDICINE

## 2019-10-24 PROCEDURE — 99207 ZZC CDG-CODE CATEGORY CHANGED: CPT | Performed by: INTERNAL MEDICINE

## 2019-10-24 PROCEDURE — 25000132 ZZH RX MED GY IP 250 OP 250 PS 637: Performed by: INTERNAL MEDICINE

## 2019-10-24 PROCEDURE — 83036 HEMOGLOBIN GLYCOSYLATED A1C: CPT | Performed by: INTERNAL MEDICINE

## 2019-10-24 PROCEDURE — 80048 BASIC METABOLIC PNL TOTAL CA: CPT | Performed by: INTERNAL MEDICINE

## 2019-10-24 PROCEDURE — 99233 SBSQ HOSP IP/OBS HIGH 50: CPT | Performed by: INTERNAL MEDICINE

## 2019-10-24 PROCEDURE — 36415 COLL VENOUS BLD VENIPUNCTURE: CPT | Performed by: INTERNAL MEDICINE

## 2019-10-24 PROCEDURE — 84443 ASSAY THYROID STIM HORMONE: CPT | Performed by: INTERNAL MEDICINE

## 2019-10-24 PROCEDURE — 12000000 ZZH R&B MED SURG/OB

## 2019-10-24 PROCEDURE — 25000128 H RX IP 250 OP 636: Performed by: INTERNAL MEDICINE

## 2019-10-24 RX ORDER — LISINOPRIL 20 MG/1
20 TABLET ORAL DAILY
Status: DISCONTINUED | OUTPATIENT
Start: 2019-10-25 | End: 2019-10-25 | Stop reason: HOSPADM

## 2019-10-24 RX ADMIN — LISINOPRIL 5 MG: 5 TABLET ORAL at 08:00

## 2019-10-24 RX ADMIN — ASPIRIN 81 MG: 81 TABLET, DELAYED RELEASE ORAL at 08:00

## 2019-10-24 RX ADMIN — LISINOPRIL 15 MG: 10 TABLET ORAL at 16:50

## 2019-10-24 RX ADMIN — HYDRALAZINE HYDROCHLORIDE 10 MG: 20 INJECTION INTRAMUSCULAR; INTRAVENOUS at 10:14

## 2019-10-24 RX ADMIN — LEVETIRACETAM 1000 MG: 10 INJECTION INTRAVENOUS at 05:50

## 2019-10-24 RX ADMIN — LEVETIRACETAM 1000 MG: 10 INJECTION INTRAVENOUS at 17:00

## 2019-10-24 ASSESSMENT — ACTIVITIES OF DAILY LIVING (ADL)
ADLS_ACUITY_SCORE: 13
ADLS_ACUITY_SCORE: 13
ADLS_ACUITY_SCORE: 18
ADLS_ACUITY_SCORE: 13
ADLS_ACUITY_SCORE: 13
ADLS_ACUITY_SCORE: 18

## 2019-10-24 NOTE — PROCEDURES
Procedure Date: 10/23/2019      ONE-HOUR ELECTROENCEPHALOGRAM WITH VIDEO       EEG #: [id number]      DATE OF STUDY:  One-hour video EEG on 10/23/2019.      CLINICAL SUMMARY:  The patient is a 63-year-old female with no significant past medical history, who presented with uncontrollable jerking and has confusion and delayed speech.  EEG was performed to evaluate for seizures.     TECHNICAL SUMMARY: This continuous video- EEG monitoring procedure was performed with 23 scalp electrodes in 10-20 electrode system placement, and additional scalp, precordial and other surface electrodes used for electrical referencing and artifact detection.  Video monitoring was utilized and periodically reviewed by EEG technologists and the physician for electroclinical correlations.     INTERICTAL ACTIVITY:  During quiet wakefulness, there was 9 Hz alpha activity over the posterior head regions, which was symmetric and reactive.  At the beginning of the recording, there were frequent generalized spike and wave and more frequently generalized polyspike and wave discharges, which typically occurred in isolation.  At times, they came in, quasi periodic pattern at 0.5-0.8 Hz.  The patient had myoclonic jerks during some of these with upper body jerking, for example at 14:39:39 and 14:41:30.  The patient received lorazepam 2 mg at 14:52:32.  Discharges decreased at approximately 14:53:40 and improved significantly by 15:00 and was only seen rarely  afterwards.  There was seen fast beta activity after Ativan was given.      IMPRESSION:  This is an abnormal video EEG due to the presence of frequent generalized epileptiform discharges at times associated with myoclonic jerks consistent with generalized cortical irritability and generalized epilepsy.  After patient was given Ativan, EEG improved significantly.         OLIVIA DAY MD             D: 10/24/2019   T: 10/24/2019   MT: OLEG      Name:     VITA NEVILLE   MRN:       [id number]        Account:        [id number]   :      1955           Procedure Date: 10/23/2019      Document: [id number]

## 2019-10-24 NOTE — PLAN OF CARE
AOx4, up with SBA. VSS on RA ex HTN- Hydralazine given x1 and Lisinopril dose increased. Neuros intact. Seizure precautions maintained, no sz activity noted. Neuro following, continuing with IV Keppra. Tele SR. Voiding well in BR, 1 BM today. Plan for discharge tomorrow, pt has flight back to [place] at 1400 tomorrow- MD notified.

## 2019-10-24 NOTE — PLAN OF CARE
A&Ox4. Neuros intact. VSS. Tele NSR. Regular diet. Up with A1 and GB. Voiding adequately. Denies pain. Seizure precaution maintained, no witness or reported seizure. Pt calm and cooperative. Plan for general neuro consult, discharge pending.

## 2019-10-24 NOTE — PROGRESS NOTES
"[facility]    Hospitalist Progress Note      Assessment & Plan   Molly Davis is a 63 year old female with no significant past medical history, here from [place], [place], here in [place] for work conference who presents to the emergency room for evaluation of new onset jerking motions in her upper and lower extremities with slowed speech and confusion.  Initial workup in the emergency room was negative for stroke.  An EEG is being done at this time and is suggestive of a seizure.  She will be admitted to the [facility] for ongoing evaluation       New onset seizure  Initial code stroke workup in the emergency room was nonrevealing.    EEG in the ED shows \" frequent generalized epileptiform discharges at times associated with myoclonic jerks consistent with generalized cortical irritability and generalized epilepsy.EEG improved after ativan administration.\" She is s/p Keppra Load in the ED. MRI of the brain without acute abnormality  - continue with Keppra 1000mg BID  - seizure precautions  - discussed with neurology, appreciate help. 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 This finding is age-indeterminate.This is felt to be incidental finding per neuroloyg.  As above MRI without acute stroke.   - continue with ASA daily  - f/u with neurology when she returns    Hypertension, with hypertensive urgency.    systolic blood pressures were upwards of 220 while in the emergency room. BP today in 180s and needed labetalol.  - increase lisinopril to 20mg daily  - if bp " continues to be elevated, will add amlodipine to minimize use of prn  - prn labetalol available    Hyperglycemia:  AM . She is concerned if she is diabetic. Will check A1c.       Thyroid nodule.  Incidentally noted on CT imaging. TSH in normal range.   - discuss with patient, she will establish with a primary care provider on return to [place] for continued evaluation.      DVT Prophylaxis: Pneumatic Compression Devices  Code Status: Full Code    Disposition: Expected discharge in 1 days once bp control improved.     Joey Hodges MD  Text Page  (7am to 6pm)    Interval History   Doing ok. Denies chest pain or shortness of breath. She is afebrile. No recurrent seizure. Denies headache, nausea or vomiting.     -Data reviewed today: I reviewed all new labs and imaging results over the last 24 hours. I personally reviewed the brain MRI image(s) showing as above.    Physical Exam   Temp: 97.7  F (36.5  C) Temp src: Oral BP: (!) 165/84 Pulse: 77 Heart Rate: 86 Resp: 18 SpO2: 98 % O2 Device: None (Room air)    Vitals:    10/23/19 1313   Weight: 88.2 kg (194 lb 7.1 oz)     Vital Signs with Ranges  Temp:  [97.7  F (36.5  C)-98.4  F (36.9  C)] 97.7  F (36.5  C)  Pulse:  [77] 77  Heart Rate:  [68-86] 86  Resp:  [18] 18  BP: (124-184)/(65-91) 165/84  SpO2:  [98 %-100 %] 98 %  I/O last 3 completed shifts:  In: 1641 [P.O.:340; I.V.:1301]  Out: -     Constitutional: Alert, awake and no apparent distress  Respiratory: Clear to auscultation bilaterally, no wheezing  Cardiovascular: regular rate and rhythm  GI: soft and non-tender  Skin/Integumen: warm and dry      Medications       aspirin  81 mg Oral Daily     levETIRAcetam  1,000 mg Intravenous Q12H     lisinopril  15 mg Oral Once     [START ON 10/25/2019] lisinopril  20 mg Oral Daily     sodium chloride (PF)  3 mL Intracatheter Q8H       Data   Recent Labs   Lab 10/24/19  0642 10/23/19  1314   WBC 4.8 5.2   HGB 12.4 14.2   MCV 89 87    187   INR  --  1.12   NA  140 138   POTASSIUM 3.8 4.0   CHLORIDE 110* 107   CO2 27 26   BUN 24 18   CR 0.67 0.68   ANIONGAP 3 5   DENZEL 8.7 9.5   * 177*       Recent Results (from the past 24 hour(s))   MR Brain w/o & w Contrast    Narrative    MRI BRAIN WITHOUT AND WITH CONTRAST  10/23/2019 8:36 PM    HISTORY:  New onset seizures, suspected chronic right ICA dissection.     TECHNIQUE:  Multiplanar, multisequence MRI of the brain without and  with 9 mL Gadavist.    COMPARISON: Head CT 10/23/2019.    FINDINGS:  Mild volume loss is present. A few white matter T2 hyperintensities  are present likely reflecting chronic small vessel ischemic change.  The cerebral hemispheres, brain stem, and cerebellum otherwise  demonstrate normal morphology and signal. The hippocampi, temporal  lobes, forniceal columns, and mamillary bodies are symmetric. No  evidence of mesial temporal sclerosis. No evidence of acute ischemia,  hemorrhage, mass, mass effect, or hydrocephalus. Right parietal  calvarial exostosis is present. The tympanic cavities, mastoid  cavities, and paranasal sinuses are unremarkable.      Impression    IMPRESSION: Unremarkable MRI of the head with and without contrast.    STACEY SEARS MD

## 2019-10-24 NOTE — CONSULTS
[facility]    Neurology Consultation     Date of Admission:  10/23/2019    Assessment & Plan   Molly Davis is a 63 year old female who was admitted on 10/23/2019. I was asked to see the patient for myoclonic jerks, fall.  The EEG shows spike and polyspike's, generalized consistent with generalized genetic epilepsy.  The patient is doing good we will continue Keppra for now.  There is also the carotid artery occlusion on the right, probably chronic since there are collaterals developed and the patient did not have a stroke.    I will recommend she will continue with Keppra for now.    She should follow-up with neurology for both her seizures as well as carotid artery disease.,  She will continue with an aspirin for now.  Apparently the patient is also high blood pressure lisinopril was started.    Thyroid function was normal she will have to have a thyroid ultrasound which can be done as an outpatient.    It would be interesting if we can give the patient to the EEG tracing so her neurologist in [place] might want to see it.      Esthela Hendricks MD    Code Status    Full Code    Reason for Consult   Reason for consult: I was asked by Dr Yoel Trujillo to evaluate this patient for myoclonic jerks, fall. .    Primary Care Physician   Physician No Ref-Primary    Chief Complaint   myoclonic jerks, fall.    History is obtained from the patient    History of Present Illness   Molly Davis is a 63 year old female who presents with myoclonic jerks and fall.  The patient has no significant past medical history and has not seen a physician for some time.  She is here from [facility] for a a conference.  The patient has had a couple of drinks one night prior to the event and has felt some what tired.  Yesterday morning she was well woke up and was well.  At noon she was conference when suddenly developed onset jerking of the upper and lower extremity.  The patient tried to stand up but her  feet gave out and she fell on her knees.  Her speech was also reported to be slow during this events and was thought to be confused.  Friends helped her to the chair however the patient was unable to support herself and fell to the ground.  The patient was brought to emergency room when the code stroke was called.  Head imaging does not show any evidence of acute ischemia.  No pleural perfusion deficits.  A stat EEG was obtained which showed generalized spike and polyspike's.  The patient was given 2 mg of Ativan and received 1500 mg of IV Keppra.  After the Ativan the EEG and the patient jerks improved.    The patient states that she slept well and feels fine today.  She did not have any further abnormal movements today.  The patient states that she called her family and find out that 1 of her cousins who is 61 now had seizures as a teenager.  He has been put on phenobarbital and has been seizure-free since.  The patient is single she has no children.  She does not know about other family members medical history.  Both parents  of lung cancer.    The patient also states that for the last year she lost a lot of weight, voluntary.  She did not take any medications for this.  1 year ago she was about 240 pounds now she is about 190.    The patient was found also to have high blood pressure during this hospitalization.    Past Medical History   I have reviewed this patient's medical history and updated it with pertinent information if needed.   History reviewed. No pertinent past medical history.  HTA  Obesity  Past Surgical History   I have reviewed this patient's surgical history and updated it with pertinent information if needed.  Past Surgical History:   Procedure Laterality Date     NO HISTORY OF SURGERY         Prior to Admission Medications   Prior to Admission Medications   Prescriptions Last Dose Informant Patient Reported? Taking?   Naproxen Sodium (ALEVE PO) Past Week at Unknown time  Yes Yes   Sig:  Take 1 tablet by mouth daily as needed for moderate pain   ibuprofen (ADVIL/MOTRIN) 200 MG tablet Past Week at Unknown time  Yes Yes   Sig: Take 200-600 mg by mouth daily as needed for mild pain      Facility-Administered Medications: None     Allergies   No Known Allergies    Social History   I have reviewed this patient's social history and updated it with pertinent information if needed. Molly Davis  reports that she has never smoked. She has never used smokeless tobacco. She reports current alcohol use. She reports that she does not use drugs.    Family History   I have reviewed this patient's family history and updated it with pertinent information if needed.   Family History   Problem Relation Age of Onset     Lung Cancer Mother      Lung Cancer Father    cousin 61 year old with seizure since teen age, on phenobarbital    Review of Systems   The 10 point Review of Systems is negative other than noted in the HPI or here.     Physical Exam   Temp: 97.7  F (36.5  C) Temp src: Oral BP: (!) 165/84 Pulse: 77 Heart Rate: 86 Resp: 18 SpO2: 98 % O2 Device: None (Room air)    Vital Signs with Ranges  Temp:  [97.7  F (36.5  C)-98.4  F (36.9  C)] 97.7  F (36.5  C)  Pulse:  [] 77  Heart Rate:  [] 86  Resp:  [12-23] 18  BP: (124-223)/() 165/84  SpO2:  [92 %-100 %] 98 %  194 lbs 7.13 oz    Constitutional: normal  Eyes: conjunctiva normal, no erythema  ENT: neck supple, no palpated pulses on the left  Respiratory: CTA bilat  Cardiovascular: RRR, systolic murmur  Skin: pale, small lacerations both knees  Musculoskeletal: Present peripheral pulses there is no pedal edema  Neurologic: The patient is alert oriented x3 no acute distress.  Speech is fluent there is no aphasia apraxia or agnosia.  She follows commands appropriately.  She knows the name of the president.  Fund of knowledge is normal.  Concentration and memory are normal.    Pupils are equal round and reactive to light, extraocular movements  are intact, there is no nystagmus.  Sensory exam is normal to light touch.  Facial muscles are normal bilaterally.  Uvula and tongue are midline.  Sternocleidomastoid and trapezius muscles are normal bilaterally.    Muscular mass tone and strength remedies.  There is no pronator drift.  There is no asterixis or positional tremors.  Reflexes are +1 in upper extremities and 0 in lower extremities bilaterally.  Toes are downgoing bilaterally.  Finger-nose-finger without dysmetria.  Heel-to-shin without dysmetria.  Fine movements are normal bilaterally.  Gait was deferred.    Sensory exam was normal to light touch and vibratory sense.  Neuropsychiatric: Normal affect    Data   Results for orders placed or performed during the hospital encounter of 10/23/19 (from the past 24 hour(s))   MR Brain w/o & w Contrast    Narrative    MRI BRAIN WITHOUT AND WITH CONTRAST  10/23/2019 8:36 PM    HISTORY:  New onset seizures, suspected chronic right ICA dissection.     TECHNIQUE:  Multiplanar, multisequence MRI of the brain without and  with 9 mL Gadavist.    COMPARISON: Head CT 10/23/2019.    FINDINGS:  Mild volume loss is present. A few white matter T2 hyperintensities  are present likely reflecting chronic small vessel ischemic change.  The cerebral hemispheres, brain stem, and cerebellum otherwise  demonstrate normal morphology and signal. The hippocampi, temporal  lobes, forniceal columns, and mamillary bodies are symmetric. No  evidence of mesial temporal sclerosis. No evidence of acute ischemia,  hemorrhage, mass, mass effect, or hydrocephalus. Right parietal  calvarial exostosis is present. The tympanic cavities, mastoid  cavities, and paranasal sinuses are unremarkable.      Impression    IMPRESSION: Unremarkable MRI of the head with and without contrast.    STACEY SEARS MD   Basic metabolic panel   Result Value Ref Range    Sodium 140 133 - 144 mmol/L    Potassium 3.8 3.4 - 5.3 mmol/L    Chloride 110 (H) 94 - 109 mmol/L     Carbon Dioxide 27 20 - 32 mmol/L    Anion Gap 3 3 - 14 mmol/L    Glucose 168 (H) 70 - 99 mg/dL    Urea Nitrogen 24 7 - 30 mg/dL    Creatinine 0.67 0.52 - 1.04 mg/dL    GFR Estimate >90 >60 mL/min/[1.73_m2]    GFR Estimate If Black >90 >60 mL/min/[1.73_m2]    Calcium 8.7 8.5 - 10.1 mg/dL   CBC with platelets   Result Value Ref Range    WBC 4.8 4.0 - 11.0 10e9/L    RBC Count 4.06 3.8 - 5.2 10e12/L    Hemoglobin 12.4 11.7 - 15.7 g/dL    Hematocrit 36.0 35.0 - 47.0 %    MCV 89 78 - 100 fl    MCH 30.5 26.5 - 33.0 pg    MCHC 34.4 31.5 - 36.5 g/dL    RDW 12.8 10.0 - 15.0 %    Platelet Count 178 150 - 450 10e9/L   TSH with free T4 reflex   Result Value Ref Range    TSH 1.24 0.40 - 4.00 mU/L   The MRI of the head images were reviewed and this was a normal study with no evidence for stroke or any acute or chronic changes.  The CTA of the neck shows occlusion-dissection of the right cervical internal carotid arteries with nonopacification from the level of the proximal cervical right ICA to its paraclinoid segment.  There is reconstitution into the right carotid terminus via patent [place].  The findings is age-indeterminate.  There was a mention of nodularity of the thyroid.    Upon admission the patient has had an EEG shows frequent generalized epileptiform discharges generalized spike and wave at times associated with myoclonic jerks consistent with a generalized epilepsy.  After Ativan EEG is improved significantly.

## 2019-10-25 VITALS
BODY MASS INDEX: 32.81 KG/M2 | HEART RATE: 60 BPM | OXYGEN SATURATION: 97 % | DIASTOLIC BLOOD PRESSURE: 72 MMHG | TEMPERATURE: 98.1 F | RESPIRATION RATE: 16 BRPM | HEIGHT: 64 IN | SYSTOLIC BLOOD PRESSURE: 148 MMHG | WEIGHT: 192.2 LBS

## 2019-10-25 PROCEDURE — 25000132 ZZH RX MED GY IP 250 OP 250 PS 637: Performed by: INTERNAL MEDICINE

## 2019-10-25 PROCEDURE — 25000128 H RX IP 250 OP 636: Performed by: INTERNAL MEDICINE

## 2019-10-25 PROCEDURE — 99239 HOSP IP/OBS DSCHRG MGMT >30: CPT | Performed by: INTERNAL MEDICINE

## 2019-10-25 RX ORDER — LISINOPRIL 20 MG/1
20 TABLET ORAL DAILY
Qty: 30 TABLET | Refills: 0 | Status: SHIPPED | OUTPATIENT
Start: 2019-10-25

## 2019-10-25 RX ORDER — LEVETIRACETAM 1000 MG/1
1000 TABLET ORAL 2 TIMES DAILY
Qty: 60 TABLET | Refills: 0 | Status: SHIPPED | OUTPATIENT
Start: 2019-10-25

## 2019-10-25 RX ORDER — LORAZEPAM 2 MG/ML
CONCENTRATE ORAL
Qty: 1 ML | Refills: 0 | Status: SHIPPED | OUTPATIENT
Start: 2019-10-25

## 2019-10-25 RX ADMIN — LISINOPRIL 20 MG: 20 TABLET ORAL at 10:18

## 2019-10-25 RX ADMIN — ASPIRIN 81 MG: 81 TABLET, DELAYED RELEASE ORAL at 10:18

## 2019-10-25 RX ADMIN — LEVETIRACETAM 1000 MG: 10 INJECTION INTRAVENOUS at 06:07

## 2019-10-25 ASSESSMENT — ACTIVITIES OF DAILY LIVING (ADL)
ADLS_ACUITY_SCORE: 13

## 2019-10-25 ASSESSMENT — MIFFLIN-ST. JEOR: SCORE: 1406.81

## 2019-10-25 NOTE — PLAN OF CARE
Pt AO4.  SBA.  Seizure precautions maintained.  No seizure activity noted this shift.  Denies pain.  VSS on RA.  +CMS.  Neuros intact.  Plan to discharge this AM, Flight back to [place] is at 1400.

## 2019-10-25 NOTE — PLAN OF CARE
Discharge    Patient discharged to home with belongings and prescription medications filled here. Discharge medications were filled and sent with pt. Pt educated on medications and verbalized understanding.    Care plan note: A&Ox4. VSS on RA. SBA. Seizure precautions in place. Denied pain.     Listed belongings gathered and returned to patient. Yes  Care Plan and Patient education resolved: Yes  Prescriptions if needed, hard copies sent with patient  NA  Home and hospital acquired medications returned to patient: NA  Medication Bin checked and emptied on discharge Yes  Follow up appointment made for patient: No, Pt. Will make own follow up appointments in [place].

## 2019-10-25 NOTE — PROVIDER NOTIFICATION
MD Notification    Notified Person: MD    Notified Person Name: Dr. Hodges    Notification Date/Time: 8:46 AM  October 25, 2019    Notification Interaction: paged    Purpose of Notification: FYI pt. is early morning discharge (before 11AM), no medications have been order for discharge. Pt. has a flight scheduled for 2 pm to [place]    Orders Received: pending    Comments:

## 2019-10-25 NOTE — DISCHARGE SUMMARY
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    Prediabetes  Thyroid nodule, incidental    Follow-ups Needed After Discharge   Follow-up Appointments     Follow-up and recommended labs and tests       Follow up with primary care provider once you return to [place], within 7   days for hospital follow- up.  The following labs/tests are recommended:   basic metabolic panel. Need follow up for incidental thyroid nodule.   Follow up with neurology for new onset seizure and also right carotid   artery occlusion. .             Unresulted Labs Ordered in the Past 30 Days of this Admission     No orders found from 9/23/2019 to 10/24/2019.          Discharge Disposition   Discharged to home  Condition at discharge: Stable    Hospital Course   Molly Davis is a 63 year old female with no significant past medical history, here from [place], [place], here in [place] for work conference who presents to the emergency room for evaluation of new onset jerking motions in her upper and lower extremities with slowed speech and confusion.  Initial workup in the emergency room was negative for stroke.  An EEG is being done at this time and is suggestive of a seizure.  She will be admitted to the [facility] for ongoing evaluation.       New onset seizure  Initial code stroke workup in the emergency room was nonrevealing.  EEG in the ED shows \" frequent generalized epileptiform discharges at times associated with myoclonic jerks consistent with generalized cortical irritability and generalized epilepsy.EEG improved after ativan administration.\" She is s/p Keppra Load in the ED. MRI of the brain without acute abnormality. No recurrent seizure " since admission  - continue with Keppra 1000mg BID  - will given SL ativan solution 2mg prn for seizure at time of discharge in an event episodes recur while on plane traveling back to [place]  - no driving until further evaluated by neurology.    Right internal carotid artery dissection, age indeterminate  CT perfusion showed dissection of the right cervical internalcarotid artery, with nonopacification from the level of the proximal cervical right internal carotid artery to its paraclinoid segment.There is reconstitution into the right carotid terminus via a patent  [place] of [place]. This finding is age-indeterminate.This is felt to be incidental finding per neuroloyg.  As above MRI without acute stroke.   - continue with ASA daily  - f/u with neurology when she returns,     Hypertension, with hypertensive urgency.    systolic blood pressures were upwards of 220 while in the emergency room. BP improved with initiation of Lisinopril.   - discharge jovani Lisinopril 20mg daily  - establish PCP once she returns for continued managment    Hyperglycemia, prediabetes  A1c 6.3%. advised patient continued weight loss and exercise. F/u with primary care provider for further care     Thyroid nodule.  Incidentally noted on CT imaging. TSH in normal range.   - discuss with patient, she will establish with a primary care provider on return to [place] for continued evaluation.      Advised patient importance of PCP and neurology follow up when she returns to [place]. She states she knows some physicians and is able to establish care with PCP and Neuro.     Consultations This Hospital Stay   NEUROLOGY IP CONSULT    Code Status   Full Code    Time Spent on this Encounter   I, Joey Hodges MD, personally saw the patient today and spent 35 minutes discharging this patient.       Joey Hodges MD  [facility]  ______________________________________________________________________    Physical Exam   Vital Signs:  Temp: 98.1  F (36.7  C) Temp src: Oral BP: (!) 148/72 Pulse: 60 Heart Rate: 66 Resp: 16 SpO2: 97 % O2 Device: None (Room air)    Weight: 192 lbs 3.2 oz  General Appearance: Alert, awake and no apparent distress  Respiratory: clear to auscultation bilaterally, no wheezing  Cardiovascular: regular rate and rhythm  GI: soft and non-tender  Skin: warm and dry         Primary Care Physician   Physician No Ref-Primary    Discharge Orders      Reason for your hospital stay    You were admitted to the hospital for seizure.  You will need follow up with neurology and primary care provider once you return to [place]. The following findings are noted incidentally on CAT scan imaging and need follow up when you return home.  1. Right internal carotid artery occlusion  2. Thyroid gland nodule.     Follow-up and recommended labs and tests     Follow up with primary care provider once you return to [place], within 7 days for hospital follow- up.  The following labs/tests are recommended: basic metabolic panel. Need follow up for incidental thyroid nodule.   Follow up with neurology for new onset seizure and also right carotid artery occlusion. .     Activity    Your activity upon discharge: activity as tolerated and no driving until further evaluated by neurology.     Full Code     Diet    Follow this diet upon discharge: Orders Placed This Encounter      Combination Diet Regular Diet Adult       Significant Results and Procedures   Most Recent 3 CBC's:  Recent Labs   Lab Test 10/24/19  0642 10/23/19  1314   WBC 4.8 5.2   HGB 12.4 14.2   MCV 89 87    187     Most Recent 3 BMP's:  Recent Labs   Lab Test 10/24/19  0642 10/23/19  1314    138   POTASSIUM 3.8 4.0   CHLORIDE 110* 107   CO2 27 26   BUN 24 18   CR 0.67 0.68   ANIONGAP 3 5   DENZEL 8.7 9.5   * 177*     Most Recent 2 LFT's:No lab results found.,   Results for orders placed or performed during the hospital encounter of 10/23/19   CTA Head Neck with Contrast     Narrative    CT ANGIOGRAM OF THE HEAD AND NECK WITH CONTRAST October 23, 2019 1:33  PM     HISTORY: Transient ischemic infarct, initial exam. Code Stroke.     TECHNIQUE: CT angiography with an injection of 70mL ISOVUE-370 IV with  scans through the head and neck. Images were transferred to a separate  3-D workstation where multiplanar reformations and 3-D images were  created. Estimates of carotid stenoses are made relative to the distal  internal carotid artery diameters except as noted. Radiation dose for  this scan was reduced using automated exposure control, adjustment of  the mA and/or kV according to patient size, or iterative  reconstruction technique.      COMPARISON: CT head of same date.    CT HEAD FINDINGS: No contrast enhancing lesions. Please see separate  report from CT head of same date for details regarding structural  intracranial findings.    CT ANGIOGRAM HEAD FINDINGS: The distal cervical, petrous, cavernous  and paraclinoid segments of the right internal carotid artery are  occluded. There is reconstitution into the right carotid terminus,  likely related to collateral flow across a patent [place] of [place].  The bilateral middle and anterior cerebral arteries appear patent  centrally. There is a hypoplastic A1 segment of the right anterior  cerebral artery. The anterior communicating artery and right posterior  communicating artery are present. The left posterior communicating  artery appears to be hypoplastic.    There is mild stenosis of the V4 segment of the right vertebral  artery. The left vertebral artery is patent. The bilateral basilar  artery and its major branches are patent. The bilateral posterior  cerebral arteries are patent. No aneurysm or other vascular  malformation is seen.    CT ANGIOGRAM NECK FINDINGS: Normal origin of the great vessels from  the aortic arch.     Right carotid artery: There is tapered narrowing of the right cervical  internal carotid artery just distal to the  carotid bifurcation with  subsequent complete nonopacification from the proximal cervical  internal carotid artery to the level of the skull base and involving  the intracranial internal carotid artery to the level of the  paraclinoid region. The findings are suspicious for right cervical  internal carotid artery dissection. There is a background of mild  mixed plaque at the right carotid bifurcation.    Left carotid artery: The left common and internal carotid arteries are  patent. No significant stenosis or atherosclerotic disease in the  carotid artery. Tortuous left internal carotid artery.    Vertebral arteries: Vertebral arteries are patent without evidence of  dissection. No significant stenosis.     Other findings: Mildly heterogeneous thyroid gland with calcification  in the left thyroid lobe but no dominant mass. Degenerative disc  disease, most pronounced at C5-C6 and C6-C7. Evaluation of the thyroid  is limited due to artifacts projecting over the lower neck, but there  is suggestion of some heterogeneous nodularity measuring up to 2.3 cm  involving the right paramedian inferior thyroid lobe and isthmus.      Impression    IMPRESSION:  1. Findings concerning for dissection of the right cervical internal  carotid artery, with nonopacification from the level of the proximal  cervical right internal carotid artery to its paraclinoid segment.  There is reconstitution into the right carotid terminus via a patent  [place] of [place]. This finding is age-indeterminate.  2. No obvious central arterial occlusion involving the middle cerebral  arteries, anterior cerebral arteries, posterior cerebral arteries, or  the vertebrobasilar system.  3. There is suggestion of heterogeneous nodularity involving the right  thyroid lobe and isthmus the mass measuring 2.3 cm. Recommend  nonemergent/outpatient thyroid ultrasound for further  characterization.    The findings from impression 1 and 2 were discussed by phone by  Dr. Ying with Dr. Santiago at 1:37 PM on 10/23/2019. Findings and  recommendations from impression 3 were discussed with Dr. Santiago by  myself at 4:00 PM on 10/23/2019.       GUILLERMO YING MD   CT Head Perfusion w Contrast    Narrative    CT BRAIN PERFUSION October 23, 2019 1:46 PM    HISTORY: Transient ischemic attack, initial exam. Code Stroke.    TECHNIQUE: Time sequential axial CT images of the head were acquired  during the administration of 70mL ISOVUE-370 IV. Color perfusion maps  of the brain were created from this time sequential axial source data.      Radiation dose for this scan was reduced using automated exposure  control, adjustment of the mA and/or kV according to patient size, or  iterative reconstruction technique.    COMPARISON: CT angiogram of the head and neck of same date.    FINDINGS: No gross focal or regional perfusion asymmetry is  identified. Therefore, the right carotid occlusion is potentially a  chronic phenomenon. Clinical correlation is recommended. Consider MRI  for further characterization, as warranted.    GUILLERMO YING MD   CT Head w/o Contrast    Narrative    CT SCAN OF THE HEAD WITHOUT CONTRAST   10/23/2019 1:29 PM     HISTORY: TIA, initial exam. Code stroke.    TECHNIQUE:  Axial images of the head and coronal reformations without  IV contrast material. Radiation dose for this scan was reduced using  automated exposure control, adjustment of the mA and/or kV according  to patient size, or iterative reconstruction technique.    COMPARISON: None.    FINDINGS: There is no evidence of intracranial hemorrhage, mass, acute  infarct or anomaly. Small ovoid hypodensity along the inferomedial  aspect of the lentiform nucleus, which may represent a dilated  perivascular space. The ventricles are normal in size, shape and  configuration. Mild diffuse parenchymal volume loss. Mild patchy  periventricular white matter hypodensities which are nonspecific, but  likely related to chronic  microvascular ischemic disease. Scattered  intracranial vascular calcifications are present.    There is a prominent ossified lesion arising from the inner table of  the right anterior parietal calvarium (series 5 image 24) with no  significant associated mass effect. This may represent a small area of  hyperostosis frontalis interna or small osteoma.  The visualized  portions of the sinuses and mastoids appear normal. The bony calvarium  and bones of the skull base appear intact.       Impression    IMPRESSION:   No evidence of acute intracranial hemorrhage, mass, or  herniation. ASPECT SCORE = 10.    The findings were communicated by phone by Dr. Ying to Dr. Santiago at  1:37 PM on 10/23/2019.      GUILLERMO YING MD   MR Brain w/o & w Contrast    Narrative    MRI BRAIN WITHOUT AND WITH CONTRAST  10/23/2019 8:36 PM    HISTORY:  New onset seizures, suspected chronic right ICA dissection.     TECHNIQUE:  Multiplanar, multisequence MRI of the brain without and  with 9 mL Gadavist.    COMPARISON: Head CT 10/23/2019.    FINDINGS:  Mild volume loss is present. A few white matter T2 hyperintensities  are present likely reflecting chronic small vessel ischemic change.  The cerebral hemispheres, brain stem, and cerebellum otherwise  demonstrate normal morphology and signal. The hippocampi, temporal  lobes, forniceal columns, and mamillary bodies are symmetric. No  evidence of mesial temporal sclerosis. No evidence of acute ischemia,  hemorrhage, mass, mass effect, or hydrocephalus. Right parietal  calvarial exostosis is present. The tympanic cavities, mastoid  cavities, and paranasal sinuses are unremarkable.      Impression    IMPRESSION: Unremarkable MRI of the head with and without contrast.    STACEY SEARS MD       Discharge Medications   Current Discharge Medication List      START taking these medications    Details   aspirin (ASA) 81 MG EC tablet Take 1 tablet (81 mg) by mouth daily  Qty: 30 tablet, Refills: 0     Associated Diagnoses: Carotid artery dissection (H)      levETIRAcetam (KEPPRA) 1000 MG tablet Take 1 tablet (1,000 mg) by mouth 2 times daily  Qty: 60 tablet, Refills: 0    Associated Diagnoses: Seizures (H)      lisinopril (PRINIVIL/ZESTRIL) 20 MG tablet Take 1 tablet (20 mg) by mouth daily  Qty: 30 tablet, Refills: 0    Associated Diagnoses: Malignant essential hypertension      LORazepam (LORAZEPAM INTENSOL) 2 MG/ML (HIGH CONC) solution Place 1ml (2 mg) under the tongue as needed for seizure if occurs while on plane/traveling back.  Qty: 1 mL, Refills: 0    Associated Diagnoses: Seizures (H)         CONTINUE these medications which have NOT CHANGED    Details   Naproxen Sodium (ALEVE PO) Take 1 tablet by mouth daily as needed for moderate pain         STOP taking these medications       ibuprofen (ADVIL/MOTRIN) 200 MG tablet Comments:   Reason for Stopping:             Allergies   No Known Allergies

## 2019-10-30 LAB — INTERPRETATION ECG - MUSE: NORMAL
